# Patient Record
Sex: FEMALE | Race: OTHER
[De-identification: names, ages, dates, MRNs, and addresses within clinical notes are randomized per-mention and may not be internally consistent; named-entity substitution may affect disease eponyms.]

---

## 2017-06-28 NOTE — EDM.PDOC
ED HPI GENERAL MEDICAL PROBLEM





- General


Chief Complaint: Back Pain or Injury


Stated Complaint: LOWER BACK PAIN


Time Seen by Provider: 06/28/17 14:54


Source of Information: Reports: Patient


History Limitations: Reports: No Limitations





- History of Present Illness


INITIAL COMMENTS - FREE TEXT/NARRATIVE: 





The patient is a 41-year-old female with a chief complaint of back pain. She's 

had back pain for months. Doesn't recall an injury. Pain is located in the mid 

lower back area. Pain is sharp. Worse with movement. Difficult to find a 

comfortable position. She has not taken any over-the-counter medications for 

this problem. No numbness. No weakness. No fever or recent illness.


  ** lower back region


Pain Score (Numeric/FACES): 10





- Related Data


 Allergies











Allergy/AdvReac Type Severity Reaction Status Date / Time


 


No Known Allergies Allergy   Verified 06/28/17 14:57











Home Meds: 


 Home Meds





Cyclobenzaprine [Flexeril] 10 mg PO BID PRN #20 tablet 06/28/17 [Rx]


Ibuprofen [Motrin] 800 mg PO Q8H PRN #30 tablet 06/28/17 [Rx]











Past Medical History





- Past Health History


Medical/Surgical History: Denies Medical/Surgical History


Other Cardiovascular History: Mother


Gastrointestinal History: Reports: Cholelithiasis


Other Genitourinary History: kidney stones


Musculoskeletal History: Reports: Back Pain, Chronic





- Past Surgical History


GI Surgical History: Reports: Cholecystectomy


Other GI Surgeries/Procedures: ERCP yesterday with stent placed





Social & Family History





- Family History


Family Medical History: Noncontributory





- Tobacco Use


Smoking Status *Q: Current Every Day Smoker


Years of Tobacco use: 10


Packs/Tins Daily: 0.3


Second Hand Smoke Exposure: Yes





- Caffeine Use


Caffeine Use: Reports: Soda, Tea





- Recreational Drug Use


Recreational Drug Use: No





- Living Situation & Occupation


Living situation: Reports: 





ED ROS GENERAL





- Review of Systems


Review Of Systems: See Below


Constitutional: Denies: Fever


Respiratory: Denies: Cough


Cardiovascular: Denies: Chest Pain


GI/Abdominal: Denies: Abdominal Pain


: Denies: Dysuria, Flank Pain


Musculoskeletal: Reports: Back Pain


Neurological: Denies: Paresthesia, Weakness





ED EXAM,LOWER BACK PAIN/INJURY





- Physical Exam


Exam: See Below


Exam Limited By: No Limitations


General Appearance: Alert, WD/WN, No Apparent Distress


Ears: Normal External Exam


Nose: Normal Inspection


Throat/Mouth: Normal Inspection, Normal Voice, No Airway Compromise


Head: Atraumatic, Normocephalic


Neck: Normal Inspection, Supple, Non-Tender, Full Range of Motion


Respiratory/Chest: No Respiratory Distress


Back Exam: Normal Inspection, Full Range of Motion, Vertebral Tenderness (

Diffusely throughout L spine TTP, no step-offs/deformities/masses/fluctuance.  

Skin normal. ).  No: CVA Tenderness (L), CVA Tenderness (R)


Extremities: Normal Inspection


Neurological: Alert, Normal Dorsiflexion, Normal Plantar Flexion, No Motor/

Sensory Deficits


Psychiatric: Normal Affect, Normal Mood


Skin Exam: Warm, Dry, Intact, Normal Color, No Rash





Course





- Vital Signs


Last Recorded V/S: 


 Last Vital Signs











Temp  36.9 C   06/28/17 14:52


 


Pulse  104 H  06/28/17 14:52


 


Resp  18   06/28/17 14:52


 


BP  118/76   06/28/17 14:52


 


Pulse Ox  98   06/28/17 14:52














- Orders/Labs/Meds


Orders: 


 Active Orders 24 hr











 Category Date Time Status


 


 Lumbar Spine 2 or 3V [CR] Stat Exams  06/28/17 15:28 Taken











Meds: 


Medications














Discontinued Medications














Generic Name Dose Route Start Last Admin





  Trade Name Freq  PRN Reason Stop Dose Admin


 


Cyclobenzaprine HCl  10 mg  06/28/17 15:29  06/28/17 15:35





  Flexeril  PO  06/28/17 15:30  10 mg





  ONETIME ONE   Administration


 


Ibuprofen  800 mg  06/28/17 15:29  06/28/17 15:35





  Motrin  PO  06/28/17 15:30  800 mg





  ONETIME ONE   Administration














- Re-Assessments/Exams


Free Text/Narrative Re-Assessment/Exam: 





06/28/17 16:49


Lumbar spine x-rays show normal bony alignment, no bony abnormality to explain 

her pain. She has not tried any medications for this. I prescribed ibuprofen 

and cyclobenzaprine. Discussed need for follow-up and possible additional 

testing if she is not improving. Primary care doctor may refer her for physical 

therapy as well as they feel this is appropriate.





Departure





- Departure


Time of Disposition: 16:04


Disposition: Home, Self-Care 01


Clinical Impression: 


Back pain


Qualifiers:


 Back pain location: low back pain Chronicity: acute Back pain laterality: 

midline Sciatica presence: without sciatica Qualified Code(s): M54.5 - Low back 

pain








- Discharge Information


Prescriptions: 


Cyclobenzaprine [Flexeril] 10 mg PO BID PRN #20 tablet


 PRN Reason: Muscle Spasm


Ibuprofen [Motrin] 800 mg PO Q8H PRN #30 tablet


 PRN Reason: Pain


Referrals: 


PCP,None [Primary Care Provider] - 


Forms:  ED Department Discharge


Additional Instructions: 


1. Mimbres ibuprofeno segn sea necesario para el dolor


2. Mimbres cyclobenzaprine segn sea necesario para el espasmo muscular


3. Sharon un seguimiento con un mdico de atencin primaria para recibir atencin 

adicional. Llame al [phone] si desea seguir con un mdico aqu.





- My Orders


Last 24 Hours: 


My Active Orders





06/28/17 15:28


Lumbar Spine 2 or 3V [CR] Stat 














- Assessment/Plan


Last 24 Hours: 


My Active Orders





06/28/17 15:28


Lumbar Spine 2 or 3V [CR] Stat

## 2017-06-29 NOTE — CR
Lumbar spine: AP, lateral and coned-down lateral views centered to the

 lumbosacral junction were obtained.

 

Comparison: Previous lumbar spine study of 09/29/15.

 

Disc space narrowing is seen within the lowermost disc believed to 

represent a transitional segment and is labeled as S1-S2.  Mild 

spondylolisthesis is noted at L5-S1 possibly due to degenerative 

apophyseal change.  Disc spaces are preserved.  Vertebral body heights

 are preserved.  Pedicles are intact.  Visualized transverse and 

spinous processes are intact.  Sacroiliac joints appear within normal 

limits.  Slight sclerosis along the iliac side of the sacroiliac 

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 

condensans ilii.  Surgical clips are noted from prior cholecystectomy.

 

Impression:

1.  Rudimentary disc representing transitional segment labeled as 

S1-S2.

2.  Mild spondylolisthesis at L5-S1 likely representing degenerative 

apophyseal change.

3.  Other incidental findings.

 

Diagnostic code #2

## 2017-11-16 NOTE — EDM.PDOC
ED HPI GENERAL MEDICAL PROBLEM





- General


Chief Complaint: ENT Problem


Stated Complaint: DENTAL PAIN


Time Seen by Provider: 11/16/17 12:52


Source of Information: Reports: Patient


History Limitations: Reports: Language Barrier





- History of Present Illness


INITIAL COMMENTS - FREE TEXT/NARRATIVE: 


Patient is a 42-year-old female presents ED complaining of right upper tooth 

discomfort. Patient states she was evaluated by dentist 2 weeks ago with plans 

of removal of the affected tooth in 2 weeks. Patient states over the past few 

days pain has worsened. She has taken Tylenol with no relief. States their is 

also some mild swelling to the gumline and also cheek. No fever/chills, nausea/

vomiting, neck pain, or any additional complaints. Patient denies being on any 

antibiotics recently. 


  ** Right Upper Oral/Mouth


Pain Score (Numeric/FACES): 8





- Related Data


 Allergies











Allergy/AdvReac Type Severity Reaction Status Date / Time


 


No Known Allergies Allergy   Verified 06/28/17 14:57











Home Meds: 


 Home Meds





Ibuprofen [Motrin] 800 mg PO Q8H PRN #30 tablet 06/28/17 [Rx]











Past Medical History





- Past Health History


Medical/Surgical History: Denies Medical/Surgical History


HEENT History: Reports: Other (See Below)


Other HEENT History: broken tooth


Other Cardiovascular History: Mother


Gastrointestinal History: Reports: Cholelithiasis


Other Genitourinary History: kidney stones


Musculoskeletal History: Reports: Back Pain, Chronic


Psychiatric History: Reports: Addiction, Other (See Below)


Other Psychiatric History: addiction to tobacco products





- Past Surgical History


GI Surgical History: Reports: Cholecystectomy


Other GI Surgeries/Procedures: ERCP  with stent placed





Social & Family History





- Family History


Family Medical History: Noncontributory





- Tobacco Use


Smoking Status *Q: Current Every Day Smoker


Years of Tobacco use: 10


Packs/Tins Daily: 0.5


Second Hand Smoke Exposure: Yes





- Caffeine Use


Caffeine Use: Reports: Coffee, Soda





- Recreational Drug Use


Recreational Drug Use: No





- Living Situation & Occupation


Living situation: Reports: 





ED ROS ENT





- Review of Systems


Review Of Systems: ROS reveals no pertinent complaints other than HPI.





ED EXAM, ENT





- Physical Exam


Exam: See Below


Exam Limited By: No Limitations


General Appearance: Alert, WD/WN, No Apparent Distress


Ears: Hearing Grossly Normal


Nose: Normal Inspection


Mouth/Throat: Normal Inspection, Normal Lips, Other (Swelling to the gingiva of 

#6 tooth with the #5 tooth hanging by tissue. There were 5 tooth is freely 

movable ready to fall out. Patient states they're planning on removing both the 

fifth and 6 tooth.)





Course





- Vital Signs


Last Recorded V/S: 


 Last Vital Signs











Temp  97.7 F   11/16/17 11:22


 


Pulse  93   11/16/17 11:22


 


Resp  16   11/16/17 11:22


 


BP  113/80   11/16/17 11:22


 


Pulse Ox  93 L  11/16/17 11:22














- Orders/Labs/Meds


Meds: 


Medications














Discontinued Medications














Generic Name Dose Route Start Last Admin





  Trade Name Freq  PRN Reason Stop Dose Admin


 


Hydrocodone Bitart/Acetaminophen  1 tab  11/16/17 13:16  11/16/17 13:35





  Norco 325-5 Mg  PO  11/16/17 13:17  1 tab





  ONETIME ONE   Administration


 


Penicillin V Potassium  500 mg  11/16/17 13:16  11/16/17 13:41





  Veetids  PO  11/16/17 13:17  500 mg





  ONETIME ONE   Administration














- Re-Assessments/Exams


Free Text/Narrative Re-Assessment/Exam: 


Order penicillin and Norco. We'll discharge patient home with instructions as 

documented.











Departure





- Departure


Time of Disposition: 13:20


Disposition: Home, Self-Care 01


Condition: Good


Clinical Impression: 


 Pain, dental, Dental abscess








- Discharge Information


Instructions:  Dental Abscess, Easy-to-Read, Dental Abscess


Referrals: 


PCP,None [Primary Care Provider] - 


Forms:  ED Department Discharge


Additional Instructions: 


Take the full course of antibiotics as prescribed. Utilize ibuprofen 600 mg 

every 6 hours with food and water. Make an appointment with dentist for 

definitive treatment. Follow-up with PCP as needed for any new or worsening 

complaints. No driving today since receiving a sedative medication while in the 

ED.

## 2019-10-18 ENCOUNTER — HOSPITAL ENCOUNTER (EMERGENCY)
Dept: HOSPITAL 41 - JD.ED | Age: 44
Discharge: HOME | End: 2019-10-18
Payer: SELF-PAY

## 2019-10-18 VITALS — HEART RATE: 94 BPM | DIASTOLIC BLOOD PRESSURE: 86 MMHG | SYSTOLIC BLOOD PRESSURE: 130 MMHG

## 2019-10-18 DIAGNOSIS — F17.210: ICD-10-CM

## 2019-10-18 DIAGNOSIS — N83.201: Primary | ICD-10-CM

## 2019-10-18 PROCEDURE — 96375 TX/PRO/DX INJ NEW DRUG ADDON: CPT

## 2019-10-18 PROCEDURE — 86140 C-REACTIVE PROTEIN: CPT

## 2019-10-18 PROCEDURE — 96361 HYDRATE IV INFUSION ADD-ON: CPT

## 2019-10-18 PROCEDURE — 96374 THER/PROPH/DIAG INJ IV PUSH: CPT

## 2019-10-18 PROCEDURE — 85027 COMPLETE CBC AUTOMATED: CPT

## 2019-10-18 PROCEDURE — 80053 COMPREHEN METABOLIC PANEL: CPT

## 2019-10-18 PROCEDURE — 96376 TX/PRO/DX INJ SAME DRUG ADON: CPT

## 2019-10-18 PROCEDURE — 85007 BL SMEAR W/DIFF WBC COUNT: CPT

## 2019-10-18 PROCEDURE — 81001 URINALYSIS AUTO W/SCOPE: CPT

## 2019-10-18 PROCEDURE — 76830 TRANSVAGINAL US NON-OB: CPT

## 2019-10-18 PROCEDURE — 84703 CHORIONIC GONADOTROPIN ASSAY: CPT

## 2019-10-18 PROCEDURE — 99284 EMERGENCY DEPT VISIT MOD MDM: CPT

## 2019-10-18 PROCEDURE — 36415 COLL VENOUS BLD VENIPUNCTURE: CPT

## 2019-10-18 PROCEDURE — 74019 RADEX ABDOMEN 2 VIEWS: CPT

## 2019-10-18 NOTE — CR
Abdomen: Supine and upright views the abdomen were obtained.

 

Comparison: No prior abdominal x-ray.

 

Scattered gas within small bowel and colon is seen which appears 

within normal limits.  Surgical clips are seen from prior 

cholecystectomy.  Minimal calcifications are seen within the pelvis 

likely representing phleboliths.  No free air is seen.  Bony 

structures are unremarkable.

 

Impression:

1.  Nothing acute is seen on two-view abdominal x-ray.

 

Diagnostic code #2

## 2019-10-18 NOTE — EDM.PDOC
<Aliza Linares - Last Filed: 10/18/19 14:12>





ED HPI GENERAL MEDICAL PROBLEM





- General


Chief Complaint: Abdominal Pain


Stated Complaint: LOWER ABDOMINAL PAIN


Time Seen by Provider: 10/18/19 10:52


Source of Information: Reports: Patient (pt does not speak English- is 

at the bedside to translate.)


History Limitations: Reports: No Limitations





- History of Present Illness


INITIAL COMMENTS - FREE TEXT/NARRATIVE: 





43 year old female with complaints of right lower quadrant abdominal plain that 

started about 1 hour ago.  Pt states that she was working when the abdominal 

pain started.  Pt is a house keeper.  Denies any heavy lifting at the time the 

abdominal pain began.  The pain is constant.  Unable to describe the pain.  The 

pain is worse with palpation or movement and nothing makes the pain better.  

Denies nausea/vomiting or diarrhea.  Denies fever chills.  Last bowel movement 

was 2 days ago.  Pt does have issues with constipation and normally has a bowel 

movement every 2-3 days.  There is a possibility that the patient could be 

pregnant.  LMP was on Oct. 5th and this was a normal period per the patient.  

Denies complaints of dysuria.  Denies fever or chills.  The patient is 

currently eating [employer].  Took two ibuprofen when the pain started and this 

did not help.


  ** Right Lower Abdomen


Pain Score (Numeric/FACES): 10





- Related Data


 Allergies











Allergy/AdvReac Type Severity Reaction Status Date / Time


 


No Known Allergies Allergy   Verified 10/18/19 10:49











Home Meds: 


 Home Meds





Acetaminophen/HYDROcodone [Norco 325-5 MG] 1 tab PO Q6H PRN #14 tablet 10/18/19 

[Rx]











Past Medical History





- Past Health History


Medical/Surgical History: Denies Medical/Surgical History


HEENT History: Reports: Other (See Below)


Other HEENT History: broken tooth


Other Cardiovascular History: Mother


Gastrointestinal History: Reports: Cholelithiasis


Genitourinary History: Reports: Renal Calculus


Other Genitourinary History: kidney stones


Musculoskeletal History: Reports: Back Pain, Chronic


Psychiatric History: Reports: Addiction, Other (See Below)


Other Psychiatric History: addiction to tobacco products





- Past Surgical History


GI Surgical History: Reports: Cholecystectomy


Other GI Surgeries/Procedures: ERCP  with stent placed





Social & Family History





- Family History


Family Medical History: Noncontributory





- Tobacco Use


Smoking Status *Q: Current Some Day Smoker


Years of Tobacco use: 10


Packs/Tins Daily: 0.5





- Caffeine Use


Caffeine Use: Reports: Coffee





- Recreational Drug Use


Recreational Drug Use: No





- Living Situation & Occupation


Living situation: Reports: 





ED ROS GENERAL





- Review of Systems


HEENT: Reports: No Symptoms


Respiratory: Reports: No Symptoms


Cardiovascular: Reports: No Symptoms


Endocrine: Reports: No Symptoms


GI/Abdominal: Reports: Abdominal Pain, Constipation


: Reports: No Symptoms


Musculoskeletal: Reports: No Symptoms


Skin: Reports: No Symptoms


Neurological: Reports: No Symptoms


Psychiatric: Reports: No Symptoms


Hematologic/Lymphatic: Reports: No Symptoms


Immunologic: Reports: No Symptoms





ED EXAM, GI/ABD





- Physical Exam


Exam Limited By: Language Barrier


General Appearance: Alert, Mild Distress


Eyes: Bilateral: Normal Appearance


Ears: Normal External Exam


Nose: Normal Inspection


Throat/Mouth: Normal Inspection


Head: Normocephalic


Neck: Normal Inspection


Respiratory/Chest: No Respiratory Distress, Lungs Clear


Cardiovascular: Normal Peripheral Pulses, Regular Rate, Rhythm, No Edema


GI/Abdominal Exam: Normal Bowel Sounds, Soft, Guarding, Tender


 (Female) Exam: Deferred


Rectal (Female) Exam: Deferred


Back Exam: Normal Inspection


Extremities: Normal Inspection


Neurological: Alert, Oriented, Normal Cognition


Psychiatric: Normal Affect


Skin Exam: Warm, Dry, Intact


Lymphatic: No Adenopathy





Course





- Vital Signs


Last Recorded V/S: 


 Last Vital Signs











Temp  97.5 F   10/18/19 10:49


 


Pulse  94   10/18/19 10:49


 


Resp  14   10/18/19 10:49


 


BP  130/86   10/18/19 10:49


 


Pulse Ox  96   10/18/19 10:49














- Orders/Labs/Meds


Labs: 


 Laboratory Tests











  10/18/19 10/18/19 10/18/19 Range/Units





  11:21 11:21 11:45 


 


WBC     (3.98-10.04)  K/mm3


 


RBC     (3.98-5.22)  M/mm3


 


Hgb     (11.2-15.7)  gm/dl


 


Hct     (34.1-44.9)  %


 


MCV     (79.4-94.8)  fl


 


MCH     (25.6-32.2)  pg


 


MCHC     (32.2-35.5)  g/dl


 


RDW Std Deviation     (36.4-46.3)  fL


 


Plt Count     (182-369)  K/mm3


 


MPV     (9.4-12.3)  fl


 


Neutrophils % (Manual)     (40-60)  %


 


Band Neutrophils %     (0-10)  %


 


Lymphocytes % (Manual)     (20-40)  %


 


Atypical Lymphs %     %


 


Monocytes % (Manual)     (2-10)  %


 


Eosinophils % (Manual)     (0.7-5.8)  %


 


Basophils % (Manual)     (0.1-1.2)  


 


Platelet Estimate     


 


RBC Morph Comment     


 


Sodium  140    (136-145)  mEq/L


 


Potassium  4.0    (3.5-5.1)  mEq/L


 


Chloride  107    ()  mEq/L


 


Carbon Dioxide  25    (21-32)  mEq/L


 


Anion Gap  12.0    (5-15)  


 


BUN  15    (7-18)  mg/dL


 


Creatinine  0.7    (0.55-1.02)  mg/dL


 


Est Cr Clr Drug Dosing  87.64    mL/min


 


Estimated GFR (MDRD)  > 60    (>60)  mL/min


 


BUN/Creatinine Ratio  21.4 H    (14-18)  


 


Glucose  91    ()  mg/dL


 


Calcium  8.4 L    (8.5-10.1)  mg/dL


 


Total Bilirubin  0.2    (0.2-1.0)  mg/dL


 


AST  11 L    (15-37)  U/L


 


ALT  9 L    (14-59)  U/L


 


Alkaline Phosphatase  70    ()  U/L


 


C-Reactive Protein  < 0.2    (<1.0)  mg/dL


 


Total Protein  6.7    (6.4-8.2)  g/dl


 


Albumin  3.5    (3.4-5.0)  g/dl


 


Globulin  3.2    gm/dL


 


Albumin/Globulin Ratio  1.1    (1-2)  


 


HCG, Qual   Negative   (NEGATIVE)  


 


Urine Color    Yellow  (Yellow)  


 


Urine Appearance    Clear  (Clear)  


 


Urine pH    6.0  (5.0-8.0)  


 


Ur Specific Gravity    > or = 1.030  (1.005-1.030)  


 


Urine Protein    Negative  (Negative)  


 


Urine Glucose (UA)    Negative  (Negative)  


 


Urine Ketones    1+ H  (Negative)  


 


Urine Occult Blood    Negative  (Negative)  


 


Urine Nitrite    Negative  (Negative)  


 


Urine Bilirubin    Negative  (Negative)  


 


Urine Urobilinogen    0.2  (0.2-1.0)  


 


Ur Leukocyte Esterase    Negative  (Negative)  


 


Urine RBC    0-5  (0-5)  /hpf


 


Urine WBC    0-5  (0-5)  /hpf


 


Ur Squamous Epith Cells    0-5  (0-5)  /hpf


 


Urine Bacteria    Few  (FEW)  /hpf


 


Urine Mucus    Few  (FEW)  /hpf














  10/18/19 Range/Units





  12:25 


 


WBC  8.09  (3.98-10.04)  K/mm3


 


RBC  4.45  (3.98-5.22)  M/mm3


 


Hgb  13.0  (11.2-15.7)  gm/dl


 


Hct  40.8  (34.1-44.9)  %


 


MCV  91.7  (79.4-94.8)  fl


 


MCH  29.2  (25.6-32.2)  pg


 


MCHC  31.9 L  (32.2-35.5)  g/dl


 


RDW Std Deviation  45.3  (36.4-46.3)  fL


 


Plt Count  253  (182-369)  K/mm3


 


MPV  10.7  (9.4-12.3)  fl


 


Neutrophils % (Manual)  72 H  (40-60)  %


 


Band Neutrophils %  0  (0-10)  %


 


Lymphocytes % (Manual)  21  (20-40)  %


 


Atypical Lymphs %  0  %


 


Monocytes % (Manual)  5  (2-10)  %


 


Eosinophils % (Manual)  1  (0.7-5.8)  %


 


Basophils % (Manual)  1  (0.1-1.2)  


 


Platelet Estimate  Adequate  


 


RBC Morph Comment  Normal  


 


Sodium   (136-145)  mEq/L


 


Potassium   (3.5-5.1)  mEq/L


 


Chloride   ()  mEq/L


 


Carbon Dioxide   (21-32)  mEq/L


 


Anion Gap   (5-15)  


 


BUN   (7-18)  mg/dL


 


Creatinine   (0.55-1.02)  mg/dL


 


Est Cr Clr Drug Dosing   mL/min


 


Estimated GFR (MDRD)   (>60)  mL/min


 


BUN/Creatinine Ratio   (14-18)  


 


Glucose   ()  mg/dL


 


Calcium   (8.5-10.1)  mg/dL


 


Total Bilirubin   (0.2-1.0)  mg/dL


 


AST   (15-37)  U/L


 


ALT   (14-59)  U/L


 


Alkaline Phosphatase   ()  U/L


 


C-Reactive Protein   (<1.0)  mg/dL


 


Total Protein   (6.4-8.2)  g/dl


 


Albumin   (3.4-5.0)  g/dl


 


Globulin   gm/dL


 


Albumin/Globulin Ratio   (1-2)  


 


HCG, Qual   (NEGATIVE)  


 


Urine Color   (Yellow)  


 


Urine Appearance   (Clear)  


 


Urine pH   (5.0-8.0)  


 


Ur Specific Gravity   (1.005-1.030)  


 


Urine Protein   (Negative)  


 


Urine Glucose (UA)   (Negative)  


 


Urine Ketones   (Negative)  


 


Urine Occult Blood   (Negative)  


 


Urine Nitrite   (Negative)  


 


Urine Bilirubin   (Negative)  


 


Urine Urobilinogen   (0.2-1.0)  


 


Ur Leukocyte Esterase   (Negative)  


 


Urine RBC   (0-5)  /hpf


 


Urine WBC   (0-5)  /hpf


 


Ur Squamous Epith Cells   (0-5)  /hpf


 


Urine Bacteria   (FEW)  /hpf


 


Urine Mucus   (FEW)  /hpf











Meds: 


Medications














Discontinued Medications














Generic Name Dose Route Start Last Admin





  Trade Name Freq  PRN Reason Stop Dose Admin


 


Hydromorphone HCl  0.5 mg  10/18/19 11:19  10/18/19 11:49





  Dilaudid  IVPUSH  10/18/19 11:20  0.5 mg





  ONETIME ONE   Administration





     





     





     





     


 


Hydromorphone HCl  0.5 mg  10/18/19 16:53  10/18/19 17:01





  Dilaudid  IVPUSH  10/18/19 16:54  0.5 mg





  ONETIME ONE   Administration





     





     





     





     


 


Sodium Chloride  1,000 mls @ 150 mls/hr  10/18/19 11:30  10/18/19 11:49





  Normal Saline  IV   150 mls/hr





  ASDIRECTED OSITO   Administration





     





     





     





     


 


Ondansetron HCl  4 mg  10/18/19 11:19  10/18/19 11:49





  Zofran  IVPUSH  10/18/19 11:20  4 mg





  ONETIME ONE   Administration





     





     





     





     














- Re-Assessments/Exams


Free Text/Narrative Re-Assessment/Exam: 





10/18/19 14:12


Pt reports lower abdominal pain is slightly better.  Was able to ambulate to 

the bathroom, but when she sat down to void she complains of increased pain to 

her lower abdomen from sitting. Will follow up with pelvic ultrasound.





Departure





- Departure


Disposition: Home, Self-Care 01


Clinical Impression: 


Ovarian cyst


Qualifiers:


 Laterality: right Qualified Code(s): N83.201 - Unspecified ovarian cyst, right 

side








- Discharge Information


Prescriptions: 


Acetaminophen/HYDROcodone [Norco 325-5 MG] 1 tab PO Q6H PRN #14 tablet


 PRN Reason: Pain


Instructions:  Ovarian Cyst, Easy-to-Read


Referrals: 


PCP,None [Primary Care Provider] - 


Forms:  ED Department Discharge, ED Return to Work/School Form


Additional Instructions: 


The ultrasound did show 2 small cysts on your right ovary as discussed. The one 

has leaked some fluid to that area of your pelvis which is likely what is 

causing most of your discomfort. That pain will get better over the next 2-3 

days. Tylenol every 6-8 hours for mild to moderate discomfort or hydrocodone if 

needed for more severe pain. Do not take Tylenol and hydrocodone at the same 

time. Follow-up with one of our clinic providers if not much better within 2-3 

days as expected. Call [phone] for appointment as needed. Return to ED if 

symptoms worsening. 





<Shelton Quinones - Last Filed: 10/24/19 09:17>





ED ROS GENERAL





- Review of Systems


Review Of Systems: See Below





ED EXAM, GI/ABD





- Physical Exam


Exam: See Below





Course





- Re-Assessments/Exams


Free Text/Narrative Re-Assessment/Exam: 





10/24/19 09:14


Initial hx and exam was done by ALEXI Hogue student.  I have also examined 

patient.  I agree with hx and exam as documented. Pelvic US did show R ovarian 

cyst with some localized fluid which does help expain her current sx,  

discharge instr. as documented. 








Departure





- Departure


Time of Disposition: 17:09


Condition: Fair

## 2019-10-18 NOTE — US
Pelvic ultrasound: Multiple real-time images were obtained 

transvaginally.

 

Comparison: No prior pelvic ultrasound.

 

Small subserosal fibroid is seen within the posterior body of the 

uterus measuring 3.1 cm.

 

Endometrial thickness is normal at 9 mm.  

 

Several nabothian cysts are noted.  

 

Small amount of fluid is seen within the cul-de-sac which is likely 

physiologic.  

 

Slightly complicated cyst is noted off the right ovary measuring up 

2.9 cm. 2nd more complicated finding is also noted off the right ovary

 measuring 2.2 cm presumably due to hemorrhagic cyst.  Left ovary is 

unremarkable.

 

Measurements:

Uterus: Length 10.0 cm, AP height 5.9 cm,transverse width 6.2 cm

Right ovary: 5.4 x 2.3 x 2.9 cm

Left ovary: 2.9 x 1.1 x 1.6 cm

 

Impression:

1.  2 findings within the right ovary as described above.  Recommend 

follow-up pelvic ultrasound in 3-4 months to make sure findings 

resolve.

2.  Small amount of fluid seen within the pelvis believed to be 

physiologic.

3.  Incidental small nabothian cyst.

4.  Small subserosal fibroid within the uterus.

 

Diagnostic code #3

## 2020-09-02 ENCOUNTER — HOSPITAL ENCOUNTER (EMERGENCY)
Dept: HOSPITAL 41 - JD.ED | Age: 45
Discharge: HOME | End: 2020-09-02
Payer: SELF-PAY

## 2020-09-02 VITALS — DIASTOLIC BLOOD PRESSURE: 78 MMHG | HEART RATE: 76 BPM | SYSTOLIC BLOOD PRESSURE: 108 MMHG

## 2020-09-02 DIAGNOSIS — G56.03: ICD-10-CM
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
## 2020-09-02 DIAGNOSIS — S39.012A: Primary | ICD-10-CM

## 2020-09-02 DIAGNOSIS — F17.210: ICD-10-CM

## 2020-09-02 DIAGNOSIS — S29.012A: ICD-10-CM

## 2020-09-02 PROCEDURE — 96372 THER/PROPH/DIAG INJ SC/IM: CPT

## 2020-09-02 PROCEDURE — 99283 EMERGENCY DEPT VISIT LOW MDM: CPT

## 2020-09-02 NOTE — EDM.PDOC
ED HPI GENERAL MEDICAL PROBLEM





- General


Chief Complaint: Back Pain or Injury


Stated Complaint: BACK PAIN


Time Seen by Provider: 09/02/20 08:14





- History of Present Illness


INITIAL COMMENTS - FREE TEXT/NARRATIVE: 





44-year-old female presents the emergency room with back pain and problems with 

her hands going numb.





Patient developed back pain couple of days ago this is in the upper lumbar lower

thoracic region on the left side of her back.  Patient denies any sort of 

trauma.  She has not had problems like this in the past.  Patient has not had 

any loss of bowel or bladder control she has no leg pain numbness or weakness 

associated with this.





The patient's hands have been going numb for quite some time she usually awakens

and her hands are numb.  She is not having problems dropping things or using her

hands they are just uncomfortable.  She denies pain shooting up into her arms or

shoulders.  And she is not having any neck pain.





Patient's history was obtained using a  that was very helpful with 

this patient encounter.


  ** back


Pain Score (Numeric/FACES): 8





- Related Data


                                    Allergies











Allergy/AdvReac Type Severity Reaction Status Date / Time


 


No Known Allergies Allergy   Verified 09/02/20 07:52











Home Meds: 


                                    Home Meds





Cyclobenzaprine [Flexeril] 10 mg PO BEDTIME #5 tab 09/02/20 [Rx]


Naproxen 500 mg PO ASDIRECTED #14 tablet 09/02/20 [Rx]











Past Medical History





- Past Health History


Medical/Surgical History: Denies Medical/Surgical History


HEENT History: Reports: Other (See Below)


Other HEENT History: broken tooth


Other Cardiovascular History: Mother


Gastrointestinal History: Reports: Cholelithiasis


Genitourinary History: Reports: Renal Calculus


Other Genitourinary History: kidney stones


Musculoskeletal History: Reports: Back Pain, Chronic


Psychiatric History: Reports: Addiction, Other (See Below)


Other Psychiatric History: addiction to tobacco products





- Past Surgical History


GI Surgical History: Reports: Cholecystectomy


Other GI Surgeries/Procedures: ERCP  with stent placed





Social & Family History





- Family History


Family Medical History: Noncontributory





- Tobacco Use


Smoking Status *Q: Current Every Day Smoker


Years of Tobacco use: 20


Packs/Tins Daily: 0.2





- Caffeine Use


Caffeine Use: Reports: Coffee





- Recreational Drug Use


Recreational Drug Use: No





- Living Situation & Occupation


Living situation: Reports: 





ED ROS GENERAL





- Review of Systems


Review Of Systems: See Below


Constitutional: Reports: No Symptoms


HEENT: Reports: No Symptoms


Respiratory: Reports: No Symptoms


Cardiovascular: Reports: No Symptoms


Endocrine: Reports: No Symptoms


GI/Abdominal: Reports: No Symptoms


: Reports: No Symptoms


Musculoskeletal: Reports: Back Pain, Other


Skin: Reports: No Symptoms


Neurological: Reports: Other (Hand numbness as stated above)





ED EXAM,LOWER BACK PAIN/INJURY





- Physical Exam


Exam: See Below


Exam Limited By: Other (No limitations with the exam the  via the iPad

 was present and during the entire exam.)


General Appearance: Alert, No Apparent Distress


Head: Atraumatic, Normocephalic


Neck: Normal Inspection, Supple, Non-Tender, Full Range of Motion, Other (No 

muscle spasm).  No: Tender Midline


Cardiovascular: Normal Peripheral Pulses, Regular Rate, Rhythm, No Edema, No 

Gallop, No JVD, No Murmur, No Rub


GI/Abdominal: Normal Bowel Sounds, Soft, Non-Tender


Back Exam: Other (Examination of her back shows no spinous process discomfort 

normal alignment.  She is got some paraspinous muscle spasm on the left side in 

the lumbar area getting worse moving superiorly this stops however in the 

midthoracic area)


Neurological: Alert, Normal Mood/Affect, Normal Dorsiflexion, Other (Tinel sign 

is positive in both wrists with distal paresthesias into the thumb index middle 

finger and to a lesser degree ring finger no pinky finger involvement this is 

noted bilaterally.)





Course





- Vital Signs


Last Recorded V/S: 


                                Last Vital Signs











Temp  36.4 C   09/02/20 07:50


 


Pulse  87   09/02/20 07:50


 


Resp  19   09/02/20 07:50


 


BP  118/84   09/02/20 07:50


 


Pulse Ox  100   09/02/20 07:50














- Orders/Labs/Meds


Meds: 


Medications














Discontinued Medications














Generic Name Dose Route Start Last Admin





  Trade Name Rosa  PRN Reason Stop Dose Admin


 


Ketorolac Tromethamine  30 mg  09/02/20 08:37  09/02/20 08:42





  Toradol  IM  09/02/20 08:38  30 mg





  ONETIME ONE   Administration














- Re-Assessments/Exams


Free Text/Narrative Re-Assessment/Exam: 





09/02/20 08:48


Patient will be started on naproxen 500 mg twice daily to be started on Flexeril

 10 mg 1 at bedtime for 5 days.  She is instructed to  some carpal tunnel

 splints and wear them at night.





Departure





- Departure


Time of Disposition: 08:49


Disposition: Home, Self-Care 01


Clinical Impression: 


 Bilateral carpal tunnel syndrome, Lumbar strain, Strain of thoracic back region








- Discharge Information


Prescriptions: 


Cyclobenzaprine [Flexeril] 10 mg PO BEDTIME #5 tab


Naproxen 500 mg PO ASDIRECTED #14 tablet


Instructions:  Back Injury Prevention, Easy-to-Read, Muscle Strain, 

Easy-to-Read, Lumbar Strain


Referrals: 


PCP,None [Primary Care Provider] - 


Forms:  ED Department Discharge, ED Return to Work/School Form


Additional Instructions: 


Return to the emergency room with any questions problems or worsening symptoms.





Follow-up in the hospital clinic in 1 week for recheck.  [phone]





You have been started on naproxen.  Take 1 twice daily with your morning and 

evening meals.  Be certain to take with food otherwise this medicine can cause 

stomach upset.





You have been started on Flexeril, this is a muscle relaxant take 1 in the 

evenings for the next 5 days to help your back feel better.





 some carpal tunnel wrist splints at the pharmacy and wear at night when 

you are trying to sleep.





Sepsis Event Note (ED)





- Evaluation


Sepsis Screening Result: No Definite Risk





- Focused Exam


Vital Signs: 


                                   Vital Signs











  Temp Pulse Resp BP Pulse Ox


 


 09/02/20 07:50  36.4 C  87  19  118/84  100

## 2021-09-06 ENCOUNTER — HOSPITAL ENCOUNTER (EMERGENCY)
Dept: HOSPITAL 41 - JD.ED | Age: 46
Discharge: HOME | End: 2021-09-06
Payer: SELF-PAY

## 2021-09-06 VITALS — SYSTOLIC BLOOD PRESSURE: 111 MMHG | HEART RATE: 77 BPM | DIASTOLIC BLOOD PRESSURE: 72 MMHG

## 2021-09-06 DIAGNOSIS — F17.210: ICD-10-CM

## 2021-09-06 DIAGNOSIS — S83.91XA: Primary | ICD-10-CM
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
## 2021-09-06 PROCEDURE — 99283 EMERGENCY DEPT VISIT LOW MDM: CPT

## 2021-09-06 PROCEDURE — 73564 X-RAY EXAM KNEE 4 OR MORE: CPT

## 2021-09-06 PROCEDURE — 96372 THER/PROPH/DIAG INJ SC/IM: CPT

## 2021-09-06 NOTE — CR
Right knee: 4 views of the right knee were obtained.

 

Comparison: No prior knee studies available.

 

Joint effusion appears to be present.  Medial joint space is minimally

 narrowed as compared to the lateral joint.  No acute fracture, 

dislocation or other bony abnormality is appreciated.

 

Impression:

1.  Joint effusion.

2.  Minimal medial joint space narrowing.

3.  Nothing acute is otherwise seen.

 

Diagnostic code #2

## 2021-09-06 NOTE — EDM.PDOC
ED HPI GENERAL MEDICAL PROBLEM





- General


Chief Complaint: Lower Extremity Injury/Pain


Stated Complaint: RT KNEE PAIN


Time Seen by Provider: 09/06/21 14:00


Source of Information: Reports: Patient


History Limitations: Reports: No Limitations





- History of Present Illness


INITIAL COMMENTS - FREE TEXT/NARRATIVE: 





The patient presents with right knee pain and swelling.  This started 2 days 

ago.  She woke up with the pain one morning.  She does not recall hurting her 

knee recently.  She has no fever, chills, cough, chest pain, or shortness of 

breath.


Onset: Sudden


Duration: Day(s): (2)


Location: Reports: Lower Extremity, Right


Quality: Reports: Sharp


Severity: Moderate


Improves with: Reports: Immobilization


Worsens with: Reports: Movement


Associated Symptoms: Reports: No Other Symptoms


  ** Right Knee


Pain Score (Numeric/FACES): 10





- Related Data


                                    Allergies











Allergy/AdvReac Type Severity Reaction Status Date / Time


 


No Known Allergies Allergy   Verified 09/02/20 07:52











Home Meds: 


                                    Home Meds





. [No Known Home Meds]  09/06/21 [History]











Past Medical History





- Past Health History


Medical/Surgical History: Denies Medical/Surgical History


HEENT History: Reports: Other (See Below)


Other HEENT History: broken tooth


Other Cardiovascular History: Mother


Gastrointestinal History: Reports: Cholelithiasis


Genitourinary History: Reports: Renal Calculus


Other Genitourinary History: kidney stones


Musculoskeletal History: Reports: Back Pain, Chronic


Psychiatric History: Reports: Addiction, Other (See Below)


Other Psychiatric History: addiction to tobacco products





- Past Surgical History


GI Surgical History: Reports: Cholecystectomy


Other GI Surgeries/Procedures: ERCP  with stent placed





Social & Family History





- Family History


Family Medical History: No Pertinent Family History





- Tobacco Use


Tobacco Use Status *Q: Current Every Day Tobacco User


Years of Tobacco use: 15


Packs/Tins Daily: 0.5





- Caffeine Use


Caffeine Use: Reports: Coffee





- Recreational Drug Use


Recreational Drug Use: No





- Living Situation & Occupation


Living situation: Reports: 





Review of Systems





- Review of Systems


Review Of Systems: See Below


Constitutional: Reports: No Symptoms


Eyes: Reports: No Symptoms


Ears: Reports: No Symptoms


Nose: Reports: No Symptoms


Mouth/Throat: Reports: No Symptoms


Respiratory: Reports: No Symptoms


Cardiovascular: Reports: No Symptoms


GI/Abdominal: Reports: No Symptoms


Genitourinary: Reports: No Symptoms


Musculoskeletal: Reports: Other (right knee pain and swelling)





ED EXAM, GENERAL





- Physical Exam


Exam: See Below


Exam Limited By: No Limitations


General Appearance: Alert, No Apparent Distress


Ears: Normal External Exam


Nose: Normal Inspection


Head: Atraumatic, Normocephalic


Neck: Normal Inspection


Respiratory/Chest: No Respiratory Distress


Extremities: Other (Pain upon palpation to the right medial knee with some 

edema.  Good sensation and pulses distally.)





Course





- Vital Signs


Last Recorded V/S: 





                                Last Vital Signs











Temp  97.7 F   09/06/21 14:03


 


Pulse  77   09/06/21 14:03


 


Resp  20   09/06/21 14:03


 


BP  111/72   09/06/21 14:03


 


Pulse Ox  100   09/06/21 14:03














- Orders/Labs/Meds


Orders: 





                               Active Orders 24 hr











 Category Date Time Status


 


 Knee Min 4V Rt [CR] Stat Exams  09/06/21 14:29 Taken


 


 Durable Medical Equipment for Discharge [DME for Oth  09/06/21 15:29 Ordered





 Discharge] [COMM] Stat   














- Re-Assessments/Exams


Free Text/Narrative Re-Assessment/Exam: 





09/06/21 15:33


I ordered an x-ray of her knee and it looks good.  I checked her ligaments and 

they are all stable.





Departure





- Departure


Time of Disposition: 15:35


Disposition: Home, Self-Care 01


Condition: Good


Clinical Impression: 


Right knee sprain


Qualifiers:


 Encounter type: initial encounter Involved ligament of knee: unspecified 

ligament Qualified Code(s): S83.91XA - Sprain of unspecified site of right knee,

 initial encounter








- Discharge Information


*PRESCRIPTION DRUG MONITORING PROGRAM REVIEWED*: Not Applicable


*COPY OF PRESCRIPTION DRUG MONITORING REPORT IN PATIENT TORRES: Not Applicable


Referrals: 


PCP,None [Primary Care Provider] - 


Ti Willis MD [Physician] - 1 Week


Additional Instructions: 


Wear the knee immobilizer for a week.  Ice your knee for 15 minutes 3 times per 

day for 2 days.  Take tylenol or motrin as needed for pain.  If that does not 

help, try the ultram.  Follow up with Dr Willis within a week.  Please return if 

you are worse.





Sepsis Event Note (ED)





- Focused Exam


Vital Signs: 





                                   Vital Signs











  Temp Pulse Resp BP Pulse Ox


 


 09/06/21 14:03  97.7 F  77  20  111/72  100














- My Orders


Last 24 Hours: 





My Active Orders





09/06/21 14:29


Knee Min 4V Rt [CR] Stat 





09/06/21 15:29


Durable Medical Equipment for Discharge [DME for Discharge] [COMM] Stat 














- Assessment/Plan


Last 24 Hours: 





My Active Orders





09/06/21 14:29


Knee Min 4V Rt [CR] Stat 





09/06/21 15:29


Durable Medical Equipment for Discharge [DME for Discharge] [COMM] Stat

## 2021-10-01 ENCOUNTER — HOSPITAL ENCOUNTER (EMERGENCY)
Dept: HOSPITAL 41 - JD.ED | Age: 46
Discharge: HOME | End: 2021-10-01
Payer: SELF-PAY

## 2021-10-01 VITALS — HEART RATE: 93 BPM | SYSTOLIC BLOOD PRESSURE: 131 MMHG | DIASTOLIC BLOOD PRESSURE: 80 MMHG

## 2021-10-01 DIAGNOSIS — Z72.0: ICD-10-CM

## 2021-10-01 DIAGNOSIS — M54.41: Primary | ICD-10-CM

## 2021-10-01 PROCEDURE — 99283 EMERGENCY DEPT VISIT LOW MDM: CPT

## 2021-10-01 PROCEDURE — 96372 THER/PROPH/DIAG INJ SC/IM: CPT

## 2021-10-01 NOTE — EDM.PDOC
ED HPI GENERAL MEDICAL PROBLEM





- General


Chief Complaint: Back Pain or Injury


Stated Complaint: BACK PAIN


Time Seen by Provider: 10/01/21 14:31


Source of Information: Reports: Patient, Family (Son), RN Notes Reviewed


History Limitations: Reports: No Limitations





- History of Present Illness


INITIAL COMMENTS - FREE TEXT/NARRATIVE: 





Patient is a 45-year-old female who presents to the ER with her son for the 

evaluation of her lower back pain.  Patient is primarily Italian-speaking so the

son helps to translate.  Patient has been having some back pain for the last 2 

days, that seems to radiate down her right leg.  She is a , so is 

constantly moving about and bending.  She states no trauma or injury has 

happened however she woke up with this pain 2 days ago.  She is denying any sort

of numbness or tingling distal to the back pain.  She has not had issues like 

this in the past.  She has not taken anything for medications at home.  She is 

denying any sort of urinary symptoms, bowel symptoms or saddle like anesthesia.


  ** Back


Pain Score (Numeric/FACES): 10





- Related Data


                                    Allergies











Allergy/AdvReac Type Severity Reaction Status Date / Time


 


No Known Allergies Allergy   Verified 10/01/21 14:34











Home Meds: 


                                    Home Meds





Hydrocodone/Acetaminophen [HYDROcodone-Acetaminophen 5-325 MG] 1 each PO Q6H PRN

#12 tablet 10/01/21 [Rx]


Naproxen [Naprosyn] 500 mg PO Q12HR #14 tab 10/01/21 [Rx]


predniSONE 20 mg PO ASDIRECTED #15 tab 10/01/21 [Rx]











Past Medical History





- Past Health History


Medical/Surgical History: Denies Medical/Surgical History


HEENT History: Reports: Other (See Below)


Other HEENT History: broken tooth


Other Cardiovascular History: Mother


Gastrointestinal History: Reports: Cholelithiasis


Genitourinary History: Reports: Renal Calculus


Other Genitourinary History: kidney stones


Musculoskeletal History: Reports: Back Pain, Chronic


Psychiatric History: Reports: Addiction, Other (See Below)


Other Psychiatric History: addiction to tobacco products





- Past Surgical History


GI Surgical History: Reports: Cholecystectomy


Other GI Surgeries/Procedures: ERCP  with stent placed





Social & Family History





- Family History


Family Medical History: No Pertinent Family History





- Tobacco Use


Tobacco Use Status *Q: Current Every Day Tobacco User


Years of Tobacco use: 10


Packs/Tins Daily: 1





- Caffeine Use


Caffeine Use: Reports: Coffee, Soda





- Recreational Drug Use


Recreational Drug Use: No





- Living Situation & Occupation


Living situation: Reports: 





ED ROS GENERAL





- Review of Systems


Review Of Systems: Comprehensive ROS is negative, except as noted in HPI.





ED EXAM,LOWER BACK PAIN/INJURY





- Physical Exam


Exam: See Below


Exam Limited By: No Limitations


General Appearance: Alert, WD/WN, No Apparent Distress


Respiratory/Chest: No Respiratory Distress, Lungs Clear, Normal Breath Sounds, 

No Accessory Muscle Use, Chest Non-Tender


Cardiovascular: Normal Peripheral Pulses, Regular Rate, Rhythm, No Edema


Back Exam: Normal Inspection


Extremities: Normal Inspection, Normal Capillary Refill, Limited Range of Motion

 (of right leg, d/t pain. straight leg raise is positive)


Neurological: Alert, Normal Mood/Affect, Normal Dorsiflexion, Normal Plantar 

Flexion, Normal Gait, Straight Leg Raise (R).  No: Saddle Anesthesia


Psychiatric: Normal Affect, Normal Mood


Skin Exam: Warm, Dry, Intact, Normal Color, No Rash





Course





- Vital Signs


Last Recorded V/S: 


                                Last Vital Signs











Temp  97.3 F   10/01/21 14:33


 


Pulse  93   10/01/21 14:33


 


Resp  18   10/01/21 14:33


 


BP  131/80   10/01/21 14:33


 


Pulse Ox  100   10/01/21 14:33














- Orders/Labs/Meds


Meds: 


Medications














Discontinued Medications














Generic Name Dose Route Start Last Admin





  Trade Name Basilq  PRN Reason Stop Dose Admin


 


Ketorolac Tromethamine  60 mg  10/01/21 14:44 





  Ketorolac 60 Mg/2 Ml Sdv  IM  10/01/21 14:45 





  ONETIME ONE  














- Re-Assessments/Exams


Free Text/Narrative Re-Assessment/Exam: 





10/01/21 14:48


Patient presents to the ER for her back pain, we will give her some IM Toradol 

for pain management and send her home with prednisone, a few tablets of Norco, 

and Naprosyn for ongoing management.  This does appear to be sciatic in nature. 

 We will give her a work note to have a few days off.





Departure





- Departure


Time of Disposition: 14:49


Disposition: Home, Self-Care 01


Condition: Good


Clinical Impression: 


Sciatica


Qualifiers:


 Laterality: right Qualified Code(s): M54.31 - Sciatica, right side








- Discharge Information


*PRESCRIPTION DRUG MONITORING PROGRAM REVIEWED*: Yes


*COPY OF PRESCRIPTION DRUG MONITORING REPORT IN PATIENT TORRES: No


Prescriptions: 


Hydrocodone/Acetaminophen [HYDROcodone-Acetaminophen 5-325 MG] 1 each PO Q6H PRN

#12 tablet


 PRN Reason: Pain


Naproxen [Naprosyn] 500 mg PO Q12HR #14 tab


predniSONE 20 mg PO ASDIRECTED #15 tab


Instructions:  Sciatica, Easy-to-Read


Referrals: 


PCP,None [Primary Care Provider] - 


Forms:  ED Department Discharge, ED Return to Work/School Form


Additional Instructions: 


You were evaluated in the ER today for your back pain.





This is thought likely due to sciatica, or inflammation of the sciatic nerve, 

since this does radiate down the right leg.





You have been given prescriptions for a few different medications one will be a 

pain medication 


1. hydrocodone/acetaminophen 5/325 mg, please take 1 tab every 6 hours as needed

for pain not relieved by Naprosyn alone.  Please note this medication does 

contain Tylenol in it, so do not take more than 4000 mg in a 24-hour time span. 

These medications can be addictive, so please take as few as possible to achieve

adequate pain control.  These meds can also be quite constipating, recommend 

that you increase your oral fluid intake and take a stool softener like MiraLAX 

while taking these medications. Do not drive while taking this medication. 


2.  Naprosyn, 1 tablet 2 times a day this is an anti-inflammatory. 


3.  And prednisone, this is a steroid, to help calm the inflammation about your 

sciatic nerve.





This medication was electronically sent to the [place] pharmacy located in the [employer] [facility] [employer] grocery store.





I would recommend that you try using the Naprosyn as much as possible for pain 

management;  and limit the hydrocodone/acetaminophen tablets for pain not 

relieved by Naprosyn alone.





Your pain may take a few days to get better, if you do not experience much 

relief within 3 to 4 days, do not hesitate to seek care for ongoing management.





Sepsis Event Note (ED)





- Focused Exam


Vital Signs: 


                                   Vital Signs











  Temp Pulse Resp BP Pulse Ox


 


 10/01/21 14:33  97.3 F  93  18  131/80  100

## 2021-10-17 ENCOUNTER — HOSPITAL ENCOUNTER (EMERGENCY)
Dept: HOSPITAL 41 - JD.ED | Age: 46
Discharge: HOME | End: 2021-10-17
Payer: SELF-PAY

## 2021-10-17 VITALS — HEART RATE: 76 BPM | DIASTOLIC BLOOD PRESSURE: 86 MMHG | SYSTOLIC BLOOD PRESSURE: 114 MMHG

## 2021-10-17 DIAGNOSIS — Z72.0: ICD-10-CM

## 2021-10-17 DIAGNOSIS — U07.1: Primary | ICD-10-CM

## 2021-10-17 PROCEDURE — U0002 COVID-19 LAB TEST NON-CDC: HCPCS

## 2021-10-17 NOTE — EDM.PDOC
ED HPI GENERAL MEDICAL PROBLEM





- General


Chief Complaint: ENT Problem


Stated Complaint: SORE THROAT\ HEADACHE


Time Seen by Provider: 10/17/21 16:04


Source of Information: Reports: Patient, RN Notes Reviewed


History Limitations: Reports: No Limitations





- History of Present Illness


INITIAL COMMENTS - FREE TEXT/NARRATIVE: 


Patient is a 45-year-old female presenting to the emergency department with 

complaints of sore throat and body aches since Friday morning.  Reports she is 

been taking Tylenol ibuprofen for symptoms.  She also reports a mild nasal 

congestion.  She is had no nausea, vomiting, or diarrhea.  Denies any abdominal 

pain.  She was not vaccinated against Covid.








- Related Data


                                    Allergies











Allergy/AdvReac Type Severity Reaction Status Date / Time


 


No Known Allergies Allergy   Verified 10/17/21 16:11











Home Meds: 


                                    Home Meds





. [No Known Home Meds]  10/17/21 [History]











Past Medical History





- Past Health History


Medical/Surgical History: Denies Medical/Surgical History


HEENT History: Reports: Other (See Below)


Other HEENT History: broken tooth


Other Cardiovascular History: Mother


Gastrointestinal History: Reports: Cholelithiasis


Genitourinary History: Reports: Renal Calculus


Other Genitourinary History: kidney stones


Musculoskeletal History: Reports: Back Pain, Chronic


Psychiatric History: Reports: Addiction, Other (See Below)


Other Psychiatric History: addiction to tobacco products





- Past Surgical History


GI Surgical History: Reports: Cholecystectomy


Other GI Surgeries/Procedures: ERCP  with stent placed





Social & Family History





- Family History


Family Medical History: No Pertinent Family History





- Tobacco Use


Tobacco Use Status *Q: Current Every Day Tobacco User


Years of Tobacco use: 8


Packs/Tins Daily: 0.2





- Caffeine Use


Caffeine Use: Reports: Coffee, Soda





- Recreational Drug Use


Recreational Drug Use: No





- Living Situation & Occupation


Living situation: Reports: 





ED ROS ENT





- Review of Systems


Review Of Systems: Comprehensive ROS is negative, except as noted in HPI.





ED EXAM, ENT





- Physical Exam


Exam: See Below


Exam Limited By: No Limitations


General Appearance: Alert, WD/WN, No Apparent Distress


Mouth/Throat: Normal Inspection, Normal Gums, Normal Lips, Normal Oropharynx, 

Normal Teeth


Respiratory/Chest: No Respiratory Distress, Lungs Clear, Normal Breath Sounds, 

No Accessory Muscle Use, Chest Non-Tender


Cardiovascular: Normal Peripheral Pulses, Regular Rate, Rhythm, No Edema, No 

Gallop, No JVD, No Murmur, No Rub


Neurological: Alert, Oriented, CN II-XII Intact, Normal Cognition, Normal Gait, 

Normal Reflexes, No Motor/Sensory Deficits


Psychiatric: Normal Affect, Normal Mood


Skin: Warm, Dry, Intact, Normal Color, No Rash





Course





- Vital Signs


Last Recorded V/S: 


                                Last Vital Signs











Temp  97.8 F   10/17/21 16:09


 


Pulse  76   10/17/21 16:09


 


Resp  15   10/17/21 16:09


 


BP  114/86   10/17/21 16:09


 


Pulse Ox  95   10/17/21 16:09














- Orders/Labs/Meds


Orders: 


                               Active Orders 24 hr











 Category Date Time Status


 


 STREP A BY PCR [[facility]] Routine Lab  10/17/21 17:32 Received











Labs: 


                                Laboratory Tests











  10/17/21 Range/Units





  16:50 


 


SARS-CoV-2 RNA ([facility])  Positive H  (NEGATIVE)  














- Re-Assessments/Exams


Free Text/Narrative Re-Assessment/Exam: 


Patient is a 45-year-old female presenting to the emergency department with 

complaints of sore throat and body aches as well as nasal congestion since 

Friday.  Exam is unremarkable.  I have ordered Covid and strep testing.





10/17/21 17:59


Patient's Covid test is positive.  Discussed symptomatic treatment.  I will 

provide her a note off for work during her quarantine. Discharge instructions as

 documented.





Departure





- Departure


Time of Disposition: 18:00


Disposition: Home, Self-Care 01


Condition: Good


Clinical Impression: 


 COVID-19








- Discharge Information


*PRESCRIPTION DRUG MONITORING PROGRAM REVIEWED*: No


*COPY OF PRESCRIPTION DRUG MONITORING REPORT IN PATIENT TORRES: No


Instructions:  COVID-19


Referrals: 


PCP,None [Primary Care Provider] - 


Forms:  ED Department Discharge, ED Return to Work/School Form


Additional Instructions: 


You were found to be Covid positive.  Use Tylenol and ibuprofen as needed for 

discomfort.  Make sure you are taking adequate amount of fluid.  You must 

quarantine for 10 days from the onset of symptoms.  Note has been provided for 

work.  Return to ER should you develop any new or worsening symptoms.





 

________________________________________________________________________________


___________________________________________





Se encontr que era Covid positivo. Use Tylenol e ibuprofeno segn sea necesario

para aliviar las molestias. Asegrese de brynn la cantidad adecuada de lquido. 

Debe ponerse en cuarentena dell 10 banks desde la aparicin de los sntomas. 

Se ha proporcionado fitz nota para el trabajo. Regrese a la linda de emergencias 

si presenta algn sntoma nuevo o que empeora.





Sepsis Event Note (ED)





- Evaluation


Sepsis Screening Result: No Definite Risk





- Focused Exam


Vital Signs: 


                                   Vital Signs











  Temp Pulse Resp BP Pulse Ox


 


 10/17/21 16:09  97.8 F  76  15  114/86  95














- My Orders


Last 24 Hours: 


My Active Orders





10/17/21 17:32


STREP A BY PCR [[facility]] Routine 














- Assessment/Plan


Last 24 Hours: 


My Active Orders





10/17/21 17:32


STREP A BY PCR [[facility]] Routine

## 2022-07-04 ENCOUNTER — HOSPITAL ENCOUNTER (EMERGENCY)
Dept: HOSPITAL 41 - JD.ED | Age: 47
Discharge: HOME | End: 2022-07-04
Payer: SELF-PAY

## 2022-07-04 VITALS — HEART RATE: 78 BPM | DIASTOLIC BLOOD PRESSURE: 90 MMHG | SYSTOLIC BLOOD PRESSURE: 131 MMHG

## 2022-07-04 DIAGNOSIS — F17.210: ICD-10-CM
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
## 2022-07-04 DIAGNOSIS — S39.012A: Primary | ICD-10-CM

## 2022-07-04 PROCEDURE — 99283 EMERGENCY DEPT VISIT LOW MDM: CPT

## 2022-07-04 PROCEDURE — 72100 X-RAY EXAM L-S SPINE 2/3 VWS: CPT

## 2022-07-04 PROCEDURE — 96372 THER/PROPH/DIAG INJ SC/IM: CPT
